# Patient Record
Sex: FEMALE
[De-identification: names, ages, dates, MRNs, and addresses within clinical notes are randomized per-mention and may not be internally consistent; named-entity substitution may affect disease eponyms.]

---

## 2021-10-14 PROBLEM — Z00.00 ENCOUNTER FOR PREVENTIVE HEALTH EXAMINATION: Status: ACTIVE | Noted: 2021-10-14

## 2021-10-25 ENCOUNTER — TRANSCRIPTION ENCOUNTER (OUTPATIENT)
Age: 21
End: 2021-10-25

## 2021-10-26 ENCOUNTER — APPOINTMENT (OUTPATIENT)
Dept: SURGERY | Facility: CLINIC | Age: 21
End: 2021-10-26
Payer: COMMERCIAL

## 2021-10-26 VITALS
WEIGHT: 190 LBS | HEART RATE: 80 BPM | HEIGHT: 61 IN | DIASTOLIC BLOOD PRESSURE: 78 MMHG | SYSTOLIC BLOOD PRESSURE: 114 MMHG | BODY MASS INDEX: 35.87 KG/M2

## 2021-10-26 DIAGNOSIS — Z78.9 OTHER SPECIFIED HEALTH STATUS: ICD-10-CM

## 2021-10-26 PROCEDURE — 99204 OFFICE O/P NEW MOD 45 MIN: CPT

## 2021-10-27 NOTE — PHYSICAL EXAM
[de-identified] : bilateral 1 cm supraclavicular nodes, well circumscribed and mobile [de-identified] : no palpable thyroid nodules [Laryngoscopy Performed] : laryngoscopy was performed, see procedure section for findings [Midline] : located in midline position [Normal] : orientation to person, place, and time: normal [de-identified] : see above [de-identified] : indirect  laryngoscopy shows normal vocal cord mobility bilaterally with no lesions noted

## 2021-10-27 NOTE — ASSESSMENT
[FreeTextEntry1] : sonogram requested to assess characteristics of nodes. to call next week for results. patient has been given the opportunity to ask questions, and all of the patient's questions have been answered to their satisfaction\par

## 2021-10-27 NOTE — CONSULT LETTER
[Dear  ___] : Dear  [unfilled], [Consult Letter:] : I had the pleasure of evaluating your patient, [unfilled]. [Please see my note below.] : Please see my note below. [Consult Closing:] : Thank you very much for allowing me to participate in the care of this patient.  If you have any questions, please do not hesitate to contact me. [Sincerely,] : Sincerely, [FreeTextEntry2] : Dr. Gina Brasher, Dr. Heber Coleman [FreeTextEntry3] : Nguyễn Henderson MD, FACS\par System Director, Endocrine Surgery\par [facility]\par Assistant Clinical Professor of Surgery\par [facility] [employer] [facility]  [DrMaura  ___] : Dr. COLBERT

## 2021-10-27 NOTE — HISTORY OF PRESENT ILLNESS
[de-identified] : Pt c/o cervical lymph nodes found on MRI performed for evaluation of headaches.  history of  Dry scalp and night sweats.  denies fever, infections, skin cancer excision, dysphagia, hoarseness. \par MRI:  bilateral supraclavicular cystic structures measuring 1.5 cm\par CT scan:  Subcentimeter supraclavicular density\par I have reviewed all old and new data and available images.  Additional information was obtained from others present at the time of visit to ensure the completeness of the history\par

## 2021-11-18 ENCOUNTER — APPOINTMENT (OUTPATIENT)
Dept: ULTRASOUND IMAGING | Facility: IMAGING CENTER | Age: 21
End: 2021-11-18
Payer: COMMERCIAL

## 2021-11-18 ENCOUNTER — OUTPATIENT (OUTPATIENT)
Dept: OUTPATIENT SERVICES | Facility: HOSPITAL | Age: 21
LOS: 1 days | End: 2021-11-18
Payer: COMMERCIAL

## 2021-11-18 DIAGNOSIS — R59.0 LOCALIZED ENLARGED LYMPH NODES: ICD-10-CM

## 2021-11-18 PROCEDURE — 76536 US EXAM OF HEAD AND NECK: CPT | Mod: 26

## 2021-11-18 PROCEDURE — 76536 US EXAM OF HEAD AND NECK: CPT

## 2021-12-01 ENCOUNTER — NON-APPOINTMENT (OUTPATIENT)
Age: 21
End: 2021-12-01

## 2022-05-12 ENCOUNTER — APPOINTMENT (OUTPATIENT)
Dept: SURGERY | Facility: CLINIC | Age: 22
End: 2022-05-12
Payer: COMMERCIAL

## 2022-05-12 PROCEDURE — 99213 OFFICE O/P EST LOW 20 MIN: CPT

## 2022-05-12 NOTE — ASSESSMENT
[FreeTextEntry1] : will observe.  no indication for any biopsies, radiographs or antibiotics. to return earlier if any change.\par  patient has been given the opportunity to ask questions, and all of the patient's questions have been answered to their satisfaction\par

## 2022-05-12 NOTE — HISTORY OF PRESENT ILLNESS
[de-identified] : prior evaluation of cervical adenopathy. denies any symptoms related.  sonogram appeared benign. no changes medically since last visit. I have reviewed all old and new data and available images.

## 2022-05-12 NOTE — PHYSICAL EXAM
[de-identified] : bilateral 1 cm supraclavicular nodes, well circumscribed and mobile [de-identified] : no palpable thyroid nodules [Laryngoscopy Performed] : laryngoscopy was performed, see procedure section for findings [Midline] : located in midline position [Normal] : orientation to person, place, and time: normal [de-identified] : see above

## 2022-11-15 ENCOUNTER — APPOINTMENT (OUTPATIENT)
Dept: SURGERY | Facility: CLINIC | Age: 22
End: 2022-11-15

## 2022-11-15 PROCEDURE — 99213 OFFICE O/P EST LOW 20 MIN: CPT

## 2022-11-15 RX ORDER — IBUPROFEN 800 MG/1
800 TABLET, FILM COATED ORAL
Qty: 60 | Refills: 0 | Status: ACTIVE | COMMUNITY
Start: 2022-05-17

## 2022-11-15 NOTE — HISTORY OF PRESENT ILLNESS
[de-identified] : prior evaluation of cervical adenopathy. denies any symptoms related.  sonogram appeared benign. no changes medically since last visit. I have reviewed all old and new data and available images.

## 2022-11-15 NOTE — PHYSICAL EXAM
[de-identified] : bilateral 1 cm supraclavicular nodes, well circumscribed and mobile [de-identified] : no palpable thyroid nodules [Laryngoscopy Performed] : laryngoscopy was performed, see procedure section for findings [Midline] : located in midline position [Normal] : orientation to person, place, and time: normal

## 2023-05-16 ENCOUNTER — APPOINTMENT (OUTPATIENT)
Dept: SURGERY | Facility: CLINIC | Age: 23
End: 2023-05-16
Payer: COMMERCIAL

## 2023-05-16 DIAGNOSIS — R59.0 LOCALIZED ENLARGED LYMPH NODES: ICD-10-CM

## 2023-05-16 PROCEDURE — 99213 OFFICE O/P EST LOW 20 MIN: CPT

## 2023-05-16 NOTE — ASSESSMENT
[FreeTextEntry1] : continued f/u with PCP.   no indication for any biopsies, radiographs or antibiotics.  patient has been given the opportunity to ask questions, and all of the patient's questions have been answered to their satisfaction\par

## 2023-05-16 NOTE — PHYSICAL EXAM
[de-identified] : no palpable thyroid nodules [Laryngoscopy Performed] : laryngoscopy was performed, see procedure section for findings [Midline] : located in midline position [Normal] : orientation to person, place, and time: normal [de-identified] : 4 mm right posterior neck node, well circumscribed, soft, oblong and mobile

## 2023-05-16 NOTE — HISTORY OF PRESENT ILLNESS
[de-identified] : prior evaluation of cervical adenopathy. denies any symptoms related.  sonogram appeared benign. no changes medically since last visit. I have reviewed all old and new data and available images.

## 2025-05-08 ENCOUNTER — NON-APPOINTMENT (OUTPATIENT)
Age: 25
End: 2025-05-08

## 2025-05-20 ENCOUNTER — APPOINTMENT (OUTPATIENT)
Dept: SURGERY | Facility: CLINIC | Age: 25
End: 2025-05-20
Payer: COMMERCIAL

## 2025-05-20 ENCOUNTER — APPOINTMENT (OUTPATIENT)
Dept: SURGERY | Facility: CLINIC | Age: 25
End: 2025-05-20

## 2025-05-20 VITALS — BODY MASS INDEX: 41.16 KG/M2 | WEIGHT: 218 LBS | HEIGHT: 61 IN

## 2025-05-20 DIAGNOSIS — R59.0 LOCALIZED ENLARGED LYMPH NODES: ICD-10-CM

## 2025-05-20 PROCEDURE — 99213 OFFICE O/P EST LOW 20 MIN: CPT
